# Patient Record
Sex: FEMALE | Race: WHITE | Employment: FULL TIME | URBAN - METROPOLITAN AREA
[De-identification: names, ages, dates, MRNs, and addresses within clinical notes are randomized per-mention and may not be internally consistent; named-entity substitution may affect disease eponyms.]

---

## 2022-03-16 ENCOUNTER — APPOINTMENT (OUTPATIENT)
Dept: CT IMAGING | Facility: CLINIC | Age: 62
End: 2022-03-16
Attending: EMERGENCY MEDICINE
Payer: COMMERCIAL

## 2022-03-16 ENCOUNTER — HOSPITAL ENCOUNTER (EMERGENCY)
Facility: CLINIC | Age: 62
Discharge: HOME | End: 2022-03-16
Attending: EMERGENCY MEDICINE | Admitting: EMERGENCY MEDICINE
Payer: COMMERCIAL

## 2022-03-16 VITALS
WEIGHT: 182 LBS | HEART RATE: 87 BPM | SYSTOLIC BLOOD PRESSURE: 133 MMHG | RESPIRATION RATE: 18 BRPM | DIASTOLIC BLOOD PRESSURE: 74 MMHG | TEMPERATURE: 99.2 F | OXYGEN SATURATION: 93 %

## 2022-03-16 DIAGNOSIS — K63.89 EPIPLOIC APPENDAGITIS: ICD-10-CM

## 2022-03-16 LAB
ALBUMIN SERPL-MCNC: 4 G/DL (ref 3.4–5)
ALBUMIN UR-MCNC: 30 MG/DL
ALP SERPL-CCNC: 94 U/L (ref 40–150)
ALT SERPL W P-5'-P-CCNC: 91 U/L (ref 0–50)
ANION GAP SERPL CALCULATED.3IONS-SCNC: 6 MMOL/L (ref 3–14)
APPEARANCE UR: CLEAR
AST SERPL W P-5'-P-CCNC: 37 U/L (ref 0–45)
BACTERIA #/AREA URNS HPF: ABNORMAL /HPF
BASOPHILS # BLD AUTO: 0.1 10E3/UL (ref 0–0.2)
BASOPHILS NFR BLD AUTO: 1 %
BILIRUB SERPL-MCNC: 1.2 MG/DL (ref 0.2–1.3)
BILIRUB UR QL STRIP: NEGATIVE
BUN SERPL-MCNC: 15 MG/DL (ref 7–30)
CALCIUM SERPL-MCNC: 10.3 MG/DL (ref 8.5–10.1)
CHLORIDE BLD-SCNC: 106 MMOL/L (ref 94–109)
CO2 SERPL-SCNC: 26 MMOL/L (ref 20–32)
COLOR UR AUTO: YELLOW
CREAT SERPL-MCNC: 0.6 MG/DL (ref 0.52–1.04)
EOSINOPHIL # BLD AUTO: 0.3 10E3/UL (ref 0–0.7)
EOSINOPHIL NFR BLD AUTO: 3 %
ERYTHROCYTE [DISTWIDTH] IN BLOOD BY AUTOMATED COUNT: 14.3 % (ref 10–15)
GFR SERPL CREATININE-BSD FRML MDRD: >90 ML/MIN/1.73M2
GLUCOSE BLD-MCNC: 138 MG/DL (ref 70–99)
GLUCOSE UR STRIP-MCNC: NEGATIVE MG/DL
HCT VFR BLD AUTO: 47 % (ref 35–47)
HGB BLD-MCNC: 15.8 G/DL (ref 11.7–15.7)
HGB UR QL STRIP: NEGATIVE
HOLD SPECIMEN: NORMAL
HYALINE CASTS: 2 /LPF
IMM GRANULOCYTES # BLD: 0.2 10E3/UL
IMM GRANULOCYTES NFR BLD: 1 %
KETONES UR STRIP-MCNC: ABNORMAL MG/DL
LEUKOCYTE ESTERASE UR QL STRIP: ABNORMAL
LYMPHOCYTES # BLD AUTO: 3.1 10E3/UL (ref 0.8–5.3)
LYMPHOCYTES NFR BLD AUTO: 25 %
MCH RBC QN AUTO: 28.2 PG (ref 26.5–33)
MCHC RBC AUTO-ENTMCNC: 33.6 G/DL (ref 31.5–36.5)
MCV RBC AUTO: 84 FL (ref 78–100)
MONOCYTES # BLD AUTO: 1.1 10E3/UL (ref 0–1.3)
MONOCYTES NFR BLD AUTO: 8 %
MUCOUS THREADS #/AREA URNS LPF: PRESENT /LPF
NEUTROPHILS # BLD AUTO: 7.9 10E3/UL (ref 1.6–8.3)
NEUTROPHILS NFR BLD AUTO: 62 %
NITRATE UR QL: NEGATIVE
NRBC # BLD AUTO: 0 10E3/UL
NRBC BLD AUTO-RTO: 0 /100
PH UR STRIP: 5.5 [PH] (ref 5–7)
PLATELET # BLD AUTO: 336 10E3/UL (ref 150–450)
POTASSIUM BLD-SCNC: 3.6 MMOL/L (ref 3.4–5.3)
PROT SERPL-MCNC: 7.7 G/DL (ref 6.8–8.8)
RBC # BLD AUTO: 5.61 10E6/UL (ref 3.8–5.2)
RBC URINE: 0 /HPF
SODIUM SERPL-SCNC: 138 MMOL/L (ref 133–144)
SP GR UR STRIP: 1.03 (ref 1–1.03)
SQUAMOUS EPITHELIAL: <1 /HPF
UROBILINOGEN UR STRIP-MCNC: NORMAL MG/DL
WBC # BLD AUTO: 12.6 10E3/UL (ref 4–11)
WBC URINE: 2 /HPF

## 2022-03-16 PROCEDURE — 80053 COMPREHEN METABOLIC PANEL: CPT | Performed by: EMERGENCY MEDICINE

## 2022-03-16 PROCEDURE — 96374 THER/PROPH/DIAG INJ IV PUSH: CPT | Mod: 59

## 2022-03-16 PROCEDURE — 99285 EMERGENCY DEPT VISIT HI MDM: CPT | Mod: 25

## 2022-03-16 PROCEDURE — 36415 COLL VENOUS BLD VENIPUNCTURE: CPT | Performed by: EMERGENCY MEDICINE

## 2022-03-16 PROCEDURE — 258N000003 HC RX IP 258 OP 636: Performed by: EMERGENCY MEDICINE

## 2022-03-16 PROCEDURE — 250N000011 HC RX IP 250 OP 636: Performed by: EMERGENCY MEDICINE

## 2022-03-16 PROCEDURE — 74177 CT ABD & PELVIS W/CONTRAST: CPT

## 2022-03-16 PROCEDURE — 85025 COMPLETE CBC W/AUTO DIFF WBC: CPT | Performed by: EMERGENCY MEDICINE

## 2022-03-16 PROCEDURE — 96361 HYDRATE IV INFUSION ADD-ON: CPT

## 2022-03-16 PROCEDURE — 81001 URINALYSIS AUTO W/SCOPE: CPT | Performed by: EMERGENCY MEDICINE

## 2022-03-16 PROCEDURE — 82040 ASSAY OF SERUM ALBUMIN: CPT | Performed by: EMERGENCY MEDICINE

## 2022-03-16 RX ORDER — IOPAMIDOL 755 MG/ML
500 INJECTION, SOLUTION INTRAVASCULAR ONCE
Status: COMPLETED | OUTPATIENT
Start: 2022-03-16 | End: 2022-03-16

## 2022-03-16 RX ORDER — ONDANSETRON 2 MG/ML
4 INJECTION INTRAMUSCULAR; INTRAVENOUS EVERY 30 MIN PRN
Status: DISCONTINUED | OUTPATIENT
Start: 2022-03-16 | End: 2022-03-17 | Stop reason: HOSPADM

## 2022-03-16 RX ADMIN — SODIUM CHLORIDE 1000 ML: 9 INJECTION, SOLUTION INTRAVENOUS at 21:29

## 2022-03-16 RX ADMIN — IOPAMIDOL 92 ML: 755 INJECTION, SOLUTION INTRAVENOUS at 21:45

## 2022-03-16 RX ADMIN — ONDANSETRON 4 MG: 2 INJECTION INTRAMUSCULAR; INTRAVENOUS at 21:30

## 2022-03-16 ASSESSMENT — ENCOUNTER SYMPTOMS
VOMITING: 0
ABDOMINAL DISTENTION: 1
NAUSEA: 1
DIARRHEA: 1
ABDOMINAL PAIN: 1

## 2022-03-17 NOTE — ED TRIAGE NOTES
Patient sent from urgent care for 2 days of abdominal pain, patient rates her pain a 9/10 and the pain is in her left lower abdomen,  Patient was given Toradol at urgent care.  Alert and orientated X4

## 2022-03-17 NOTE — ED PROVIDER NOTES
History   Chief Complaint:  Abdominal Pain       HPI   Radha Cifuentes is a 61 year old female with history of gastroesophageal reflux disease, hyperlipidemia and non-alcoholic fatty liver disease who presents with abdominal pain. The patient reports 2 days of left lower quadrant abdominal pain that is rated as a 9/10. She was seen earlier today at Urgent Care where she was given Toradol which helped. She also reports some nausea, abdominal distention and diarrhea. She states in March of 2021 she had a colonoscopy which showed diverticulosis. Denies vomiting. She has been eating and drinking regularly.     Review of Systems   Gastrointestinal: Positive for abdominal distention, abdominal pain, diarrhea and nausea. Negative for vomiting.   All other systems reviewed and are negative.    Allergies:  Sulfa Drugs    Medications:  Levomilnacipran  Remeron  Prilosec  Ranitidine  Vicodin  Norvasc  Pravastatin  Oretic    Past Medical History:     Hypertension Fibromyalgia   Nonalcoholic fatty liver disease  Gastroesophageal reflux disease   Onychomycosis  Depression with anxiety  Hyperlipidemia     Family History:    Mother- alcohol abuse, diabetes mellitus   Son- asthma  Sister- hyperlipidemia     Social History:  Former smoker  Presents alone    Physical Exam     Patient Vitals for the past 24 hrs:   BP Temp Temp src Pulse Resp SpO2 Weight   03/16/22 2109 (!) 177/91 -- -- 95 -- 96 % --   03/16/22 1911 (!) 152/91 99.2  F (37.3  C) Tympanic 103 18 91 % 82.6 kg (182 lb)       Physical Exam  Constitutional:       Appearance: She is well-developed.   HENT:      Right Ear: External ear normal.      Left Ear: External ear normal.      Mouth/Throat:      Mouth: Mucous membranes are moist.      Pharynx: Oropharynx is clear. No oropharyngeal exudate.   Eyes:      General: No scleral icterus.     Conjunctiva/sclera: Conjunctivae normal.      Pupils: Pupils are equal, round, and reactive to light.   Cardiovascular:       Rate and Rhythm: Normal rate and regular rhythm.      Heart sounds: Normal heart sounds. No murmur heard.    No friction rub. No gallop.   Pulmonary:      Effort: Pulmonary effort is normal. No respiratory distress.      Breath sounds: Normal breath sounds. No wheezing or rales.   Abdominal:      General: Bowel sounds are normal. There is no distension.      Palpations: Abdomen is soft. There is no mass.      Tenderness: There is abdominal tenderness. There is no right CVA tenderness, left CVA tenderness, guarding or rebound.      Comments: LLQ TTP   Musculoskeletal:         General: Normal range of motion.   Skin:     General: Skin is warm and dry.      Capillary Refill: Capillary refill takes less than 2 seconds.      Findings: No rash.   Neurological:      Mental Status: She is alert.       Emergency Department Course     Imaging:  CT Abdomen Pelvis w Contrast   Preliminary Result   IMPRESSION:    1.  Epiploic appendagitis in the left anterior pelvis.   2.  Colonic diverticula without acute diverticulitis. No bowel obstruction or inflammation.   3.  2.5 cm left ovarian cyst.   4.  Hepatomegaly and diffuse fatty infiltration of the liver.      REFERENCE:   Management of Incidental Adnexal Findings on CT and MRI: A White Paper of the ACR Incidental Findings Committee. J Am Eric Radiol 2020; 17(2):248-254.      Postmenopausal or equal to or >50 years if status unknown:      Equal to or <3 cm: No further imaging.   >3 cm on CT: Ultrasound.   Equal to or <5 cm on MRI: No follow-up if fully characterized.   >3 cm on MRI: Ultrasound in 6-12 months if not fully characterized.   >5 cm on MRI: Ultrasound in 6-12 months even if fully characterized.                 Report per radiology    Laboratory:  Labs Ordered and Resulted from Time of ED Arrival to Time of ED Departure   COMPREHENSIVE METABOLIC PANEL - Abnormal       Result Value    Sodium 138      Potassium 3.6      Chloride 106      Carbon Dioxide (CO2) 26      Anion  Gap 6      Urea Nitrogen 15      Creatinine 0.60      Calcium 10.3 (*)     Glucose 138 (*)     Alkaline Phosphatase 94      AST 37      ALT 91 (*)     Protein Total 7.7      Albumin 4.0      Bilirubin Total 1.2      GFR Estimate >90     CBC WITH PLATELETS AND DIFFERENTIAL - Abnormal    WBC Count 12.6 (*)     RBC Count 5.61 (*)     Hemoglobin 15.8 (*)     Hematocrit 47.0      MCV 84      MCH 28.2      MCHC 33.6      RDW 14.3      Platelet Count 336      % Neutrophils 62      % Lymphocytes 25      % Monocytes 8      % Eosinophils 3      % Basophils 1      % Immature Granulocytes 1      NRBCs per 100 WBC 0      Absolute Neutrophils 7.9      Absolute Lymphocytes 3.1      Absolute Monocytes 1.1      Absolute Eosinophils 0.3      Absolute Basophils 0.1      Absolute Immature Granulocytes 0.2      Absolute NRBCs 0.0     ROUTINE UA WITH MICROSCOPIC REFLEX TO CULTURE - Abnormal    Color Urine Yellow      Appearance Urine Clear      Glucose Urine Negative      Bilirubin Urine Negative      Ketones Urine Trace (*)     Specific Gravity Urine 1.032      Blood Urine Negative      pH Urine 5.5      Protein Albumin Urine 30  (*)     Urobilinogen Urine Normal      Nitrite Urine Negative      Leukocyte Esterase Urine Small (*)     Bacteria Urine Few (*)     Mucus Urine Present (*)     RBC Urine 0      WBC Urine 2      Squamous Epithelials Urine <1      Hyaline Casts Urine 2        Emergency Department Course:    Reviewed:  I reviewed nursing notes, vitals, past medical history and Care Everywhere    Assessments:  2103 I obtained history and examined the patient as noted above.   2225 I rechecked the patient and explained findings.     Interventions:  2129: NS 1L IV Bolus    2130: Zofran 4 mg IV     Disposition:  The patient was discharged to home.     Impression & Plan     CMS Diagnoses: None    Medical Decision Making:  Patient presents today for evaluation of left lower quadrant pain.  She was concerned about diverticulitis given  the location and history of diverticulosis.  CT scan actually showed epiploic appendagitis.  Curiously it was on the left lower quadrant.  She was feeling well otherwise.  There is no signs UTI.  She does not require admission at this point.  I gave her instructions on symptomatic management.  She declines narcotics.  She states that ibuprofen Tylenol seem to work well.  I given her instructions to keep a close eye on her symptoms.  She should follow-up with her regular physician next 2 to 3 days.  If symptoms worsen, or if she has high fever and severe pain, she should return to ER ASAP.    Diagnosis:    ICD-10-CM    1. Epiploic appendagitis  K63.89          Scribe Disclosure:  I, Zeenat Herbert, am serving as a scribe at 8:55 PM on 3/16/2022 to document services personally performed by Elyssa Rodriguez MD based on my observations and the provider's statements to me.            Elyssa Rodriguez MD  03/16/22 0823

## 2022-03-17 NOTE — DISCHARGE INSTRUCTIONS
There is a small part of your intestine is inflamed.  This is not due to infection.  We treat that by managing the pain.  You may take ibuprofen and Tylenol.  I would push fluids and rest.  Avoid heavy exertion.  Follow-up with your doctor in the next several days.  If symptoms worsen or if you have uncontrolled pain, you should return to the ER

## 2025-04-11 ENCOUNTER — APPOINTMENT (OUTPATIENT)
Dept: ULTRASOUND IMAGING | Facility: CLINIC | Age: 65
End: 2025-04-11
Attending: EMERGENCY MEDICINE
Payer: COMMERCIAL

## 2025-04-11 ENCOUNTER — APPOINTMENT (OUTPATIENT)
Dept: CT IMAGING | Facility: CLINIC | Age: 65
End: 2025-04-11
Attending: EMERGENCY MEDICINE
Payer: COMMERCIAL

## 2025-04-11 ENCOUNTER — HOSPITAL ENCOUNTER (EMERGENCY)
Facility: CLINIC | Age: 65
Discharge: HOME | End: 2025-04-11
Attending: EMERGENCY MEDICINE | Admitting: EMERGENCY MEDICINE
Payer: COMMERCIAL

## 2025-04-11 VITALS
SYSTOLIC BLOOD PRESSURE: 184 MMHG | BODY MASS INDEX: 36.75 KG/M2 | HEART RATE: 78 BPM | WEIGHT: 187.17 LBS | RESPIRATION RATE: 18 BRPM | TEMPERATURE: 98.1 F | HEIGHT: 60 IN | OXYGEN SATURATION: 97 % | DIASTOLIC BLOOD PRESSURE: 85 MMHG

## 2025-04-11 DIAGNOSIS — Z98.890 S/P PARATHYROIDECTOMY: ICD-10-CM

## 2025-04-11 DIAGNOSIS — R20.2 PARESTHESIA: ICD-10-CM

## 2025-04-11 DIAGNOSIS — M54.2 ANTERIOR NECK PAIN: ICD-10-CM

## 2025-04-11 DIAGNOSIS — Z90.89 S/P PARATHYROIDECTOMY: ICD-10-CM

## 2025-04-11 DIAGNOSIS — M79.661 PAIN OF RIGHT LOWER LEG: ICD-10-CM

## 2025-04-11 LAB
ANION GAP SERPL CALCULATED.3IONS-SCNC: 14 MMOL/L (ref 7–15)
ATRIAL RATE - MUSE: 110 BPM
BASOPHILS # BLD AUTO: 0.1 10E3/UL (ref 0–0.2)
BASOPHILS NFR BLD AUTO: 1 %
BUN SERPL-MCNC: 15.4 MG/DL (ref 8–23)
CALCIUM SERPL-MCNC: 9.6 MG/DL (ref 8.8–10.4)
CHLORIDE SERPL-SCNC: 106 MMOL/L (ref 98–107)
CREAT SERPL-MCNC: 0.56 MG/DL (ref 0.51–0.95)
D DIMER PPP FEU-MCNC: 0.99 UG/ML FEU (ref 0–0.5)
DIASTOLIC BLOOD PRESSURE - MUSE: NORMAL MMHG
EGFRCR SERPLBLD CKD-EPI 2021: >90 ML/MIN/1.73M2
EOSINOPHIL # BLD AUTO: 0.3 10E3/UL (ref 0–0.7)
EOSINOPHIL NFR BLD AUTO: 3 %
ERYTHROCYTE [DISTWIDTH] IN BLOOD BY AUTOMATED COUNT: 13.5 % (ref 10–15)
GLUCOSE SERPL-MCNC: 131 MG/DL (ref 70–99)
HCO3 SERPL-SCNC: 22 MMOL/L (ref 22–29)
HCT VFR BLD AUTO: 47 % (ref 35–47)
HGB BLD-MCNC: 16.3 G/DL (ref 11.7–15.7)
HOLD SPECIMEN: NORMAL
IMM GRANULOCYTES # BLD: 0.1 10E3/UL
IMM GRANULOCYTES NFR BLD: 1 %
INTERPRETATION ECG - MUSE: NORMAL
LYMPHOCYTES # BLD AUTO: 2.3 10E3/UL (ref 0.8–5.3)
LYMPHOCYTES NFR BLD AUTO: 27 %
MCH RBC QN AUTO: 28 PG (ref 26.5–33)
MCHC RBC AUTO-ENTMCNC: 34.7 G/DL (ref 31.5–36.5)
MCV RBC AUTO: 81 FL (ref 78–100)
MONOCYTES # BLD AUTO: 0.6 10E3/UL (ref 0–1.3)
MONOCYTES NFR BLD AUTO: 7 %
NEUTROPHILS # BLD AUTO: 5.2 10E3/UL (ref 1.6–8.3)
NEUTROPHILS NFR BLD AUTO: 60 %
NRBC # BLD AUTO: 0 10E3/UL
NRBC BLD AUTO-RTO: 0 /100
P AXIS - MUSE: 65 DEGREES
PLATELET # BLD AUTO: 330 10E3/UL (ref 150–450)
POTASSIUM SERPL-SCNC: 4.1 MMOL/L (ref 3.4–5.3)
PR INTERVAL - MUSE: 184 MS
QRS DURATION - MUSE: 70 MS
QT - MUSE: 342 MS
QTC - MUSE: 462 MS
R AXIS - MUSE: 29 DEGREES
RBC # BLD AUTO: 5.83 10E6/UL (ref 3.8–5.2)
SODIUM SERPL-SCNC: 142 MMOL/L (ref 135–145)
SYSTOLIC BLOOD PRESSURE - MUSE: NORMAL MMHG
T AXIS - MUSE: 64 DEGREES
TROPONIN T SERPL HS-MCNC: <6 NG/L
VENTRICULAR RATE- MUSE: 110 BPM
WBC # BLD AUTO: 8.6 10E3/UL (ref 4–11)

## 2025-04-11 PROCEDURE — 93971 EXTREMITY STUDY: CPT | Mod: RT

## 2025-04-11 PROCEDURE — 85025 COMPLETE CBC W/AUTO DIFF WBC: CPT | Performed by: EMERGENCY MEDICINE

## 2025-04-11 PROCEDURE — 36415 COLL VENOUS BLD VENIPUNCTURE: CPT | Performed by: EMERGENCY MEDICINE

## 2025-04-11 PROCEDURE — 99285 EMERGENCY DEPT VISIT HI MDM: CPT | Mod: 25

## 2025-04-11 PROCEDURE — 80048 BASIC METABOLIC PNL TOTAL CA: CPT | Performed by: EMERGENCY MEDICINE

## 2025-04-11 PROCEDURE — 250N000011 HC RX IP 250 OP 636: Performed by: EMERGENCY MEDICINE

## 2025-04-11 PROCEDURE — 250N000009 HC RX 250: Performed by: EMERGENCY MEDICINE

## 2025-04-11 PROCEDURE — 70491 CT SOFT TISSUE NECK W/DYE: CPT

## 2025-04-11 PROCEDURE — 250N000013 HC RX MED GY IP 250 OP 250 PS 637: Performed by: EMERGENCY MEDICINE

## 2025-04-11 PROCEDURE — 71275 CT ANGIOGRAPHY CHEST: CPT

## 2025-04-11 PROCEDURE — 85379 FIBRIN DEGRADATION QUANT: CPT | Performed by: EMERGENCY MEDICINE

## 2025-04-11 PROCEDURE — 84484 ASSAY OF TROPONIN QUANT: CPT | Performed by: EMERGENCY MEDICINE

## 2025-04-11 PROCEDURE — 93005 ELECTROCARDIOGRAM TRACING: CPT

## 2025-04-11 RX ORDER — IOPAMIDOL 755 MG/ML
500 INJECTION, SOLUTION INTRAVASCULAR ONCE
Status: COMPLETED | OUTPATIENT
Start: 2025-04-11 | End: 2025-04-11

## 2025-04-11 RX ORDER — AMLODIPINE BESYLATE 5 MG/1
10 TABLET ORAL ONCE
Status: COMPLETED | OUTPATIENT
Start: 2025-04-11 | End: 2025-04-11

## 2025-04-11 RX ADMIN — AMLODIPINE BESYLATE 10 MG: 5 TABLET ORAL at 08:29

## 2025-04-11 RX ADMIN — IOPAMIDOL 100 ML: 755 INJECTION, SOLUTION INTRAVENOUS at 09:16

## 2025-04-11 RX ADMIN — SODIUM CHLORIDE 65 ML: 9 INJECTION, SOLUTION INTRAVENOUS at 09:16

## 2025-04-11 ASSESSMENT — ACTIVITIES OF DAILY LIVING (ADL)
ADLS_ACUITY_SCORE: 41

## 2025-04-11 ASSESSMENT — COLUMBIA-SUICIDE SEVERITY RATING SCALE - C-SSRS
2. HAVE YOU ACTUALLY HAD ANY THOUGHTS OF KILLING YOURSELF IN THE PAST MONTH?: NO
1. IN THE PAST MONTH, HAVE YOU WISHED YOU WERE DEAD OR WISHED YOU COULD GO TO SLEEP AND NOT WAKE UP?: NO
6. HAVE YOU EVER DONE ANYTHING, STARTED TO DO ANYTHING, OR PREPARED TO DO ANYTHING TO END YOUR LIFE?: NO

## 2025-04-11 NOTE — ED PROVIDER NOTES
"  Emergency Department Note      History of Present Illness     Chief Complaint   Neck Pain      HPI   Radha Cifuentes is a 64 year old female with history of parathyroidectomy on 3/18/25, hypertension, and hyperlipidemia who presents for right-sided neck pain. Shortly after waking up this morning, patient started experiencing a \"burning\" sensation in her neck. She compares the pain to a pinched nerve. She reports intense pain when moving her neck from side to side. The area is not swollen. Patient was not exerting herself when the pain started. No known triggers. The pain is currently better but is still present. She had a parathyroidectomy about 3 weeks ago on the left side of her neck and has been doing well post-surgery. She felt fine yesterday. Patient also reports a racing heart, shortness of breath, and feeling flushed since this morning. She also notes some swelling around her right knee. No chest pain. Patient is on amlodipine and rosuvastatin.     Independent Historian   None    Review of External Notes   I reviewed nurse triage note from earlier today 25, patient had parathyroidectomy on 3/18/25.     Past Medical History     Medical History and Problem List   Hypertension  Hyperlipidemia  Hypercalcemia  Prediabetes  Hyperparathyroidism  Fatty liver disease, nonalcholic   GERD  Alcohol abuse  Fibromyalgia  Sciatica of left side   Depression    Medications   Amlodipine  Crestor   Prilosec  Auvelity    Surgical History   Parathyroidectomy    section x3  Tubal ligation     Physical Exam     Patient Vitals for the past 24 hrs:   BP Temp Temp src Pulse Resp SpO2 Height Weight   25 1121 (!) 184/85 -- -- -- -- -- -- --   25 0756 (!) 203/88 98.1  F (36.7  C) Oral 98 18 95 % 1.524 m (5') 84.9 kg (187 lb 2.7 oz)     Physical Exam  General: Adult sitting upright  Eyes: PERRL, Conjunctive within normal limits  ENT: Moist mucous membranes, oropharynx clear.   Neck: No rigidity.  No " palpable crepitus.  No palpable mass.  Trachea is midline.  Mild tenderness in the right anterior neck just adjacent to the sternocleidomastoid muscle.  No edema.  Right carotid pulse palpable in neck.  No bruit.  CV: Normal S1S2, no murmur, rub or gallop. Regular rate and rhythm  Resp: Clear to auscultation bilaterally, no wheezes, rales or rhonchi. Normal respiratory effort.  GI: Abdomen is soft, nontender and nondistended. No palpable masses. No rebound or guarding.  MSK:  Tender just distal to the right knee medially with no palpable fluctuance or effusion.  Nontender over the bony right knee.  No palpable masses or tenderness in the popliteal fossa or calf.  No distal edema.  Normal active range of motion.  Skin: Warm and dry. No rashes or lesions or ecchymoses on visible skin.  Neuro: Alert and oriented. Responds appropriately to all questions and commands. No focal findings appreciated. Normal muscle tone.  Psych: Normal mood and affect. Pleasant.     Diagnostics     Lab Results   Labs Ordered and Resulted from Time of ED Arrival to Time of ED Departure   D DIMER QUANTITATIVE - Abnormal       Result Value    D-Dimer Quantitative 0.99 (*)    BASIC METABOLIC PANEL - Abnormal    Sodium 142      Potassium 4.1      Chloride 106      Carbon Dioxide (CO2) 22      Anion Gap 14      Urea Nitrogen 15.4      Creatinine 0.56      GFR Estimate >90      Calcium 9.6      Glucose 131 (*)    CBC WITH PLATELETS AND DIFFERENTIAL - Abnormal    WBC Count 8.6      RBC Count 5.83 (*)     Hemoglobin 16.3 (*)     Hematocrit 47.0      MCV 81      MCH 28.0      MCHC 34.7      RDW 13.5      Platelet Count 330      % Neutrophils 60      % Lymphocytes 27      % Monocytes 7      % Eosinophils 3      % Basophils 1      % Immature Granulocytes 1      NRBCs per 100 WBC 0      Absolute Neutrophils 5.2      Absolute Lymphocytes 2.3      Absolute Monocytes 0.6      Absolute Eosinophils 0.3      Absolute Basophils 0.1      Absolute Immature  Granulocytes 0.1      Absolute NRBCs 0.0     TROPONIN T, HIGH SENSITIVITY - Normal    Troponin T, High Sensitivity <6         Imaging   US Lower Extremity Venous Duplex Right   Final Result   IMPRESSION:   No deep venous thrombosis in the right lower extremity.      CT Chest Pulmonary Embolism w Contrast   Final Result   IMPRESSION:      1.  No acute pulmonary embolism.   2.  No acute lung, airway, or pleural inflammatory process.      Soft tissue neck CT w contrast   Final Result   IMPRESSION:    1.  History of neck surgery reported status post parathyroidectomy 3/18/2025. No comparison imaging is available. Nothing to suggest parathyroid adenoma on current exam.      2.  Nonspecific subdermal/subcutaneous stranding in the anterior neck extends from the hyoid to the infrahyoid neck level in the midline. This is presumed postoperative. In the midline at the level of the strap musculature at the thyroid cartilaginous    level, there is a tiny ill-defined peripherally enhancing fluid collection measuring under 1 cm, as described above, which may represent developing phlegmon/abscess or residual postoperative fluid collection. Correlate with laboratory parameters and/or    consider sonographic assessment as indicated.      3.  No adenopathy within the neck is noted. No cystic or necrotic lymph nodes are noted.      4.  No airway narrowing. The mucosal and submucosal soft tissues of the upper aerodigestive tract are satisfactory.      5.  Hypodense changes left thyroid lobe may represent sequelae of postoperative changes as well with unorganized fluid collection in this region versus a colloid cyst. This is seen series 5 image 57, series 3 image 81. This is of doubtful current    clinical significance, but could be evaluated sonographically.      KEY IMAGE(S): Series 3 image 66. Series 5 image 45.             EKG   ECG results from 04/11/25   EKG 12 lead     Value    Systolic Blood Pressure     Diastolic Blood Pressure      Ventricular Rate 110    Atrial Rate 110    TN Interval 184    QRS Duration 70        QTc 462    P Axis 65    R AXIS 29    T Axis 64    Interpretation ECG      Sinus tachycardia  Septal infarct (cited on or before 22-Apr-2014)  Abnormal ECG  When compared with ECG of 28-Apr-2014 15:23, (unconfirmed)  Questionable change in initial forces of Septal leads  Interpreted by Dr. Hebert at 0800.         Independent Interpretation   None    ED Course      Medications Administered   Medications   amLODIPine (NORVASC) tablet 10 mg (10 mg Oral $Given 4/11/25 0829)   sodium chloride 0.9 % bag for CT scan flush (65 mLs Intravenous $Given 4/11/25 0916)   iopamidol (ISOVUE-370) solution 500 mL (100 mLs Intravenous $Given 4/11/25 0916)     Discussion of Management   None    ED Course   ED Course as of 04/11/25 1154   Fri Apr 11, 2025   0801 I obtained history and examined the patient as noted above.     1145 Rechecked and updated the patient. She has no symptoms right now and feels comfortable going home.        Additional Documentation  None    Medical Decision Making / Diagnosis     CMS Diagnoses: None    MIPS    CT for PE was ordered because the patient had an abnormal d-dimer.    ELVIE Cifuentes is a 64 year old female status post recent parathyroidectomy who presents emergency department with concerns for right sided neck pain that started earlier in the day and described as a burning sensation.  It did hurt when she moved her neck.  Here she has no rigidity.  She is afebrile.  No concerning findings examination of the neck including carotid bruit or pulsatile mass, fluctuant mass, cellulitis or skin change, and the patient was not in any aspiratory distress.  She was hypertensive but otherwise hemodynamically normal.  No fever.  In the postoperative period with think of complication such as infection, fluid collection, bleeding.  Fortunately none of these evident on today's evaluation.  The findings on  CT demonstrate more likely postoperative changes which would be expected rather than new or concerning findings.  Laboratory evaluation was reassuring.  D-dimer was checked given her leg pain and symptoms with concern for possible pulmonary embolism, although this would seem somewhat atypical.  As she is high risk in the postoperative period, CT was obtained to exclude acute chest pathology and ultrasound was ordered to look at the right lower extremity due to the pain.  Fortunately there are no findings here to explain her symptoms or cause alarm for PE or other acute life-threatening condition.  On reassessment she noted her symptoms had initially gone away.  Perhaps this was not neuropathic pain or paresthesia.  Although etiology of her symptoms is not clear, at this time there is not appear to be an acute life-threatening process.  She is recommend follow-up with her surgeon and her PCP with any ongoing symptoms.  She should follow-up within the next 3 to 5 days.  She felt comfortable with this plan.  She will return if symptoms worsen.  All questions were answered prior to discharge.    Disposition   The patient was discharged.     Diagnosis     ICD-10-CM    1. Anterior neck pain  M54.2     right sided      2. Paresthesia  R20.2     right anterior neck      3. Pain of right lower leg  M79.661       4. S/P parathyroidectomy  Z98.890     Z90.89            Discharge Medications   New Prescriptions    No medications on file         Scribe Disclosure:  I, Angelica Rey, am serving as a scribe at 7:58 AM on 4/11/2025 to document services personally performed by Rina Hebert MD based on my observations and the provider's statements to me.        Rina Hebert MD  04/12/25 1024

## 2025-04-11 NOTE — DISCHARGE INSTRUCTIONS
The cause of your anterior neck pain is not clear.  There are no definite findings on imaging to explain a cause for your symptoms.  The findings seem to be appropriate for postsurgical state.  There are no current signs of infection.  Please follow-up with your surgeon and/or primary care provider for any ongoing symptoms.  Return with worsening.